# Patient Record
Sex: FEMALE | Race: WHITE
[De-identification: names, ages, dates, MRNs, and addresses within clinical notes are randomized per-mention and may not be internally consistent; named-entity substitution may affect disease eponyms.]

---

## 2018-07-01 ENCOUNTER — HOSPITAL ENCOUNTER (INPATIENT)
Dept: HOSPITAL 80 - FLD | Age: 28
LOS: 1 days | Discharge: HOME | End: 2018-07-02
Attending: OBSTETRICS & GYNECOLOGY | Admitting: OBSTETRICS & GYNECOLOGY
Payer: COMMERCIAL

## 2018-07-01 DIAGNOSIS — O99.013: Primary | ICD-10-CM

## 2018-07-01 DIAGNOSIS — O32.6XX0: ICD-10-CM

## 2018-07-01 DIAGNOSIS — Z3A.40: ICD-10-CM

## 2018-07-01 DIAGNOSIS — D64.9: ICD-10-CM

## 2018-07-01 LAB — PLATELET # BLD: 168 10^3/UL (ref 150–400)

## 2018-07-01 PROCEDURE — 10907ZC DRAINAGE OF AMNIOTIC FLUID, THERAPEUTIC FROM PRODUCTS OF CONCEPTION, VIA NATURAL OR ARTIFICIAL OPENING: ICD-10-PCS | Performed by: OBSTETRICS & GYNECOLOGY

## 2018-07-01 PROCEDURE — 0KQM0ZZ REPAIR PERINEUM MUSCLE, OPEN APPROACH: ICD-10-PCS | Performed by: OBSTETRICS & GYNECOLOGY

## 2018-07-01 RX ADMIN — IBUPROFEN SCH MG: 600 TABLET ORAL at 19:20

## 2018-07-01 RX ADMIN — ACETAMINOPHEN SCH: 325 TABLET ORAL at 14:59

## 2018-07-01 RX ADMIN — ACETAMINOPHEN SCH: 325 TABLET ORAL at 23:48

## 2018-07-01 RX ADMIN — IBUPROFEN SCH MG: 600 TABLET ORAL at 13:27

## 2018-07-01 RX ADMIN — DOCUSATE SODIUM PRN MG: 100 CAPSULE, LIQUID FILLED ORAL at 19:24

## 2018-07-01 RX ADMIN — Medication SCH MG: at 16:04

## 2018-07-01 RX ADMIN — ACETAMINOPHEN SCH: 325 TABLET ORAL at 19:03

## 2018-07-01 RX ADMIN — ACETAMINOPHEN SCH: 325 TABLET ORAL at 18:33

## 2018-07-01 NOTE — PDGENHP
History and Physical





- Chief Complaint


Active labor





- History of Present Illness


28 yo  at 40w3d called this evening with new painful ctx's that began 

around 0200 this morning.  History of rapid labor and delivery with G1. Had 

been discussing IOL due to her history - but elected to wait for spontaneous 

labor.  GBS negative. Otherwise uncomplicated pregnancy - Transferred care to 

us at 33 wks from office in [place] bc she lives in [place].  2yo son Onofre was 

born at [facility] - 5 hrs from first painful ctx to baby.  He was [id number] and 

she did have time for an epidural. Two other EAB's prior to Onofre in 09 and 11 

otherwise. She was found to be anemic at 33 wks and started on iron.





Prenatal Labs:


B Pos


Ab screen neg


RPR NR


HBSAg NR


Rubella IMMUNE


Declined aneuploidy and carrier


GC/C neg


Glucola 106


Varicella and Parvo Immune





History Information





- Allergies/Home Medication List


Allergies/Adverse Reactions: 








No Known Allergies Allergy (Unverified 18 03:32)


 





I have personally reviewed and updated: family history, medical history, social 

history, surgical history





- Past Medical History


no pertinent PMH





- Surgical History


Reports: no pertinent surgical hx





- Family History


Positive for: non-pertinent


Additional family history: Hyperthyroidism





Review of Systems


Review of Systems: 





ROS: 10pt was reviewed & negative except for what was stated in HPI & below





Physical Exam


Physical Exam: 


In labor, in pain.


Gravid belly, soft between ctx's.


Baby cephalic by [place], 8lbs.





Lab Data & Imaging Review





 18 03:35

















WBC  8.96 10^3/uL (3.80-9.50)   18  03:35    


 


RBC  3.89 10^6/uL (4.18-5.33)  L  18  03:35    


 


Hgb  12.4 g/dL (12.6-16.3)  L  18  03:35    


 


Hct  35.2 % (38.0-47.0)  L  18  03:35    


 


MCV  90.5 fL (81.5-99.8)   18  03:35    


 


MCH  31.9 pg (27.9-34.1)   18  03:35    


 


MCHC  35.2 g/dL (32.4-36.7)   18  03:35    


 


RDW  12.9 % (11.5-15.2)   18  03:35    


 


Plt Count  168 10^3/uL (150-400)   18  03:35    


 


MPV  11.5 fL (8.7-11.7)   18  03:35    


 


Neut % (Auto)  59.0 % (39.3-74.2)   18  03:35    


 


Lymph % (Auto)  27.2 % (15.0-45.0)   18  03:35    


 


Mono % (Auto)  10.8 % (4.5-13.0)   18  03:35    


 


Eos % (Auto)  2.1 % (0.6-7.6)   18  03:35    


 


Baso % (Auto)  0.3 % (0.3-1.7)   18  03:35    


 


Nucleat RBC Rel Count  0.0 % (0.0-0.2)   18  03:35    


 


Absolute Neuts (auto)  5.28 10^3/uL (1.70-6.50)   18  03:35    


 


Absolute Lymphs (auto)  2.44 10^3/uL (1.00-3.00)   18  03:35    


 


Absolute Monos (auto)  0.97 10^3/uL (0.30-0.80)  H  18  03:35    


 


Absolute Eos (auto)  0.19 10^3/uL (0.03-0.40)   18  03:35    


 


Absolute Basos (auto)  0.03 10^3/uL (0.02-0.10)   18  03:35    


 


Absolute Nucleated RBC  0.00 10^3/uL (0-0.01)   18  03:35    


 


Immature Gran %  0.6 % (0.0-1.1)   18  03:35    


 


Immature Gran #  0.05 10^3/uL (0.00-0.10)   18  03:35    











Assessment & Plan


Assessment: 


28 yo  presents at 40w3d in active labor.





- Routine orders, GBS negative.


- Would like epidural, anesthesia on their way.


- Will need iron supps PP.

## 2018-07-01 NOTE — PDANEPAE
ANE Review of Systems


Review of Systems: 








ANE Patient History





- Allergies


Allergies/Adverse Reactions: 








No Known Allergies Allergy (Unverified 07/01/18 03:32)


 








ANE Labs/Vital Signs





- Labs


Result Diagrams: 


 07/01/18 03:35








ANE Physical Exam





- Airway


Neck exam: FROM


Mallampati Score: Class 1


Mouth exam: normal dental/mouth exam





- Pulmonary


Pulmonary: no respiratory distress





- Cardiovascular


Cardiovascular: regular rate and rhythym





- ASA Status


ASA Status: II





ANE Anesthesia Plan


Anesthesia Plan: epidural


Urgent/Emergent Case: Janet augustinal completed preop but documented later for safe 

timely pt care (paged [id number], arrived 03:58)

## 2018-07-01 NOTE — OBDEL
Birth Info


Birth Type: Vaginal


Presentation at Delivery: Vertex


L&D Analgesia/Anesthesia Type: Epidural


GBS+: No





- Hospital Course


Intrapartum: 


Presented at 8cm, intact. Got epidural. Once comfortable with epidural was 

found to be complete with bulging bag at introitus.


AROM with clear fluid.


Indications for Delivery: Spontaneous Labor





Vaginal Delivery





- Delivery Provider


Delivery Physician/CNM: Doc Livingston





- Labor and Delivery


Onset of Contractions Date: 18


Onset of Contractions Time: 02:00


Onset of Contractions Type: Spontaneous


Rupture of Membranes Date: 18


Rupture of Membranes Time: 04:50


Rupture of Membranes Type: Artificial


Amniotic Fluid Color: Clear


Dilation Complete Date: 18


Dilation Complete Time: 04:50


Placenta Delivery Date: 18


Placenta Delivery Time: 05:11


Total Hours of Labor: 3


Non-surgical Procedures: Amniotomy


Laceration: 2nd Degree, Other (Specify) (2nd degree perineal, sub-clitoral, 

right periurethral)


Repair: 3-0, 4-0, Vicryl


Vaginal Sponge Count Correct: Yes


Vaginal Needle Count Correct: Yes


Vaginal Sweep Performed: Yes


EBL: 200


Delivery Events: Nuchal Cord, Other (Specify) (Compound presentation with baby'

s left hand by cheek)





 Birth Data


  ** Yates


Delivery Date: 18


Delivery Time: 05:03


Sex of Infant: Male


Apgar Score (1 Min): 8


Apgar Score (5 Min): 9





Shoulder Dystocia


Time Head Delivered: 05:03


Time Body Delivered: 05:03





ICD10 Worksheet


Patient Problems: 


 Problems











Problem Status Onset


 


H/O rapid labor Acute  


 


Rapid second stage of labor Acute  


 


 (spontaneous vaginal delivery) Acute  














- ICD10 Problem Qualifiers


(1)  (spontaneous vaginal delivery)








(2) H/O rapid labor








(3) Rapid second stage of labor

## 2018-07-01 NOTE — OBPP
PostPartum Progress Note


Assessment/Plan: 


Assessment:


28 y/o PPD #1 s/p  doing well 























Plan:


Routine PPC.  Lactation support, may wish to d/c home tomorrow.  


18 12:16





Subjective/Postpartum Course: 





18 12:14


Pt is doing well this am.  She has good pain control with Ibuprofen.  She is 

ambulating, voiding without difficulty and has min lochia.  Breast feeding is 

going well and baby is doing well.  


Objective: 





 





 18 03:35 





 











Patient ABO/Rh  B POSITIVE   18  03:35    








 











Temp Pulse Resp BP Pulse Ox


 


 36.2 C   66   20   99/60 L   


 


 18 10:00  18 10:00  18 10:00  18 10:00   











Uterine Position/Fundal Height: Umbilicus -2


Uterine Tone: Firm





PostPartum Physical Exam





- Physical Exam


General Appearance: alert, no apparent distress


Neck: non-tender, full range of motion, supple


Respiratory: chest non-tender, lungs clear, normal breath sounds


Cardiac/Chest: regular rate, rhythm


Abdomen: normal bowel sounds


Extremities: swelling (no), Chilo's sign (neg)

## 2018-07-01 NOTE — POSTANESTH
Post Anesthetic Evaluation


Cardiovascular Status: Similar to Pre-Op Cond


Respiratory Status: Similar to Pre-op Cond.


Level of Consciousness/Mental Status: Alert and Oriented


Pain Control: Adequate, Prn Tx Ordered


Nausea/Vomiting Control: Adequate, Prn Tx Ordered


Complications Possibly Related to Anesthesia: None Noted

## 2018-07-02 VITALS — DIASTOLIC BLOOD PRESSURE: 48 MMHG | SYSTOLIC BLOOD PRESSURE: 96 MMHG

## 2018-07-02 RX ADMIN — DOCUSATE SODIUM PRN MG: 100 CAPSULE, LIQUID FILLED ORAL at 08:58

## 2018-07-02 RX ADMIN — IBUPROFEN SCH MG: 600 TABLET ORAL at 08:58

## 2018-07-02 RX ADMIN — IBUPROFEN SCH MG: 600 TABLET ORAL at 02:31

## 2018-07-02 RX ADMIN — Medication SCH MG: at 08:58

## 2018-07-02 RX ADMIN — ACETAMINOPHEN SCH: 325 TABLET ORAL at 05:37

## 2018-07-02 NOTE — OBGCSDC
General Delivery Information





- General Info


: 4


Para: 2


Abortions: 2


Birth Type: Vaginal


L&D Analgesia/Anesthesia Type: Epidural, Nitrous


Admission Date: 18


Labs: 





 











Patient ABO/Rh  B POSITIVE   18  03:35    


 


Hct  33.2 % (38.0-47.0)  L  18  05:30    














- Hospital Course


Intrapartum: 


Presented at 8cm, intact. Got epidural. Once comfortable with epidural was 

found to be complete with bulging bag at introitus.


AROM with clear fluid.


Postpartum: 





18 12:14


Pt is doing well this am.  She has good pain control with Ibuprofen.  She is 

ambulating, voiding without difficulty and has min lochia.  Breast feeding is 

going well and baby is doing well.  


18 09:38


Pt doing well.  baby has been latching well - seeing lactation this am.  bld 

has lessened. urinating fine.  Mood ok at this point.  daughter has come up to 

visit.  ready for d/c





Vaginal Birth





- Delivery Provider


Delivery Physician/CNM: Doc Becerra





- Diagnosis


Labor: Spontaneous


Rupture of Membranes Type: Artificial


Amniotic Fluid Color: Clear


Laceration: 2nd Degree, Other (Specify) (2nd degree perineal, sub-clitoral, 

right periurethral)


Repair: 3-0, 4-0, Vicryl


Delivery Events: Nuchal Cord, Other (Specify) (Compound presentation with baby'

s left hand by cheek)





- Procedures


Non-surgical Procedures: Amniotomy





 Birth





-  Delivery


Non-surgical Procedures: Amniotomy


EBL: 200





[facility] Birth Data


SHELLY: 18


Gestational Age: 40 week(s) and 4 day(s)


  ** Yates


Delivery Date: 18


Delivery Time: 05:02


Sex of Infant: Male


 Weight (gm): 3796 g


Apgar Score (1 Min): 8


Apgar Score (5 Min): 9





Discharge Information





- Discharge Information


Condition: Good


Instruction/Follow Up: See Instruction Sheet, Four Weeks (with therapist), Six 

Weeks (with GILBERT Becerra)

## 2018-07-02 NOTE — OBPP
PostPartum Progress Note


Assessment/Plan: 


Assessment:


PPD 1 s/p 


mild anemia























Plan:


d/c home, iron daily


18 09:37





Subjective/Postpartum Course: 





18 12:14


Pt is doing well this am.  She has good pain control with Ibuprofen.  She is 

ambulating, voiding without difficulty and has min lochia.  Breast feeding is 

going well and baby is doing well.  


18 09:38


Pt doing well.  baby has been latching well - seeing lactation this am.  bld 

has lessened. urinating fine.  Mood ok at this point.  daughter has come up to 

visit.  ready for d/c


Objective: 





 





 18 05:30 





 











Patient ABO/Rh  B POSITIVE   18  03:35    








 











Temp Pulse Resp BP Pulse Ox


 


 36.4 C   71   16   96/48 L  94 


 


 18 07:30  18 07:30  18 07:30  18 07:30  18 07:30











Uterine Position/Fundal Height: Umbilicus -1


Uterine Tone: Firm





PostPartum Physical Exam





- Physical Exam


Abdomen: non-tender, soft, other (FF at umb -1)


Extremities: non-tender, pedal edema (minimal)


Skin: normal color, warm/dry


Neuro/Psych: alert, normal mood/affect